# Patient Record
Sex: MALE | Race: WHITE | NOT HISPANIC OR LATINO
[De-identification: names, ages, dates, MRNs, and addresses within clinical notes are randomized per-mention and may not be internally consistent; named-entity substitution may affect disease eponyms.]

---

## 2021-03-22 PROBLEM — Z00.00 ENCOUNTER FOR PREVENTIVE HEALTH EXAMINATION: Status: ACTIVE | Noted: 2021-03-22

## 2021-05-05 ENCOUNTER — APPOINTMENT (OUTPATIENT)
Dept: OTOLARYNGOLOGY | Facility: CLINIC | Age: 41
End: 2021-05-05
Payer: COMMERCIAL

## 2021-05-05 VITALS
DIASTOLIC BLOOD PRESSURE: 82 MMHG | TEMPERATURE: 97.2 F | HEART RATE: 60 BPM | WEIGHT: 175 LBS | BODY MASS INDEX: 24.5 KG/M2 | SYSTOLIC BLOOD PRESSURE: 122 MMHG | HEIGHT: 71 IN

## 2021-05-05 DIAGNOSIS — Z80.9 FAMILY HISTORY OF MALIGNANT NEOPLASM, UNSPECIFIED: ICD-10-CM

## 2021-05-05 DIAGNOSIS — Z84.89 FAMILY HISTORY OF OTHER SPECIFIED CONDITIONS: ICD-10-CM

## 2021-05-05 DIAGNOSIS — Z78.9 OTHER SPECIFIED HEALTH STATUS: ICD-10-CM

## 2021-05-05 PROCEDURE — 92504 EAR MICROSCOPY EXAMINATION: CPT

## 2021-05-05 PROCEDURE — 99244 OFF/OP CNSLTJ NEW/EST MOD 40: CPT | Mod: 25

## 2021-05-05 PROCEDURE — 92567 TYMPANOMETRY: CPT

## 2021-05-05 PROCEDURE — 99072 ADDL SUPL MATRL&STAF TM PHE: CPT

## 2021-05-05 PROCEDURE — 92557 COMPREHENSIVE HEARING TEST: CPT
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
## 2021-05-05 NOTE — DATA REVIEWED
[de-identified] : I personally reviewed the patient's audiogram, which shows\par AD: mild to moderately severe CHL, CNS right tymp\par AS: hearing wnl , type A tymp\par excellent WRS

## 2021-05-05 NOTE — REASON FOR VISIT
[Initial Consultation] : an initial consultation for [FreeTextEntry2] : hearing loss and pressure in R ear
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
## 2021-05-05 NOTE — REVIEW OF SYSTEMS
[Sneezing] : sneezing [Seasonal Allergies] : seasonal allergies [Ear Pain] : ear pain [Ear Itch] : ear itch [Hearing Loss] : hearing loss [Ear Noises] : ear noises [Sinus Pain] : sinus pain [Sinus Pressure] : sinus pressure [Eye Pain] : eye pain [Negative] : Heme/Lymph [de-identified] : headache  [FreeTextEntry1] : fatigue

## 2021-05-05 NOTE — PHYSICAL EXAM
[Normal] : no rashes [de-identified] : AD: normal appearing TM, middle ear, no effusion.  Barbosa's to Right, AC>BC.  AS:normal [de-identified] : House-Brackmann 1/6 right and left

## 2021-09-07 ENCOUNTER — OUTPATIENT (OUTPATIENT)
Dept: OUTPATIENT SERVICES | Facility: HOSPITAL | Age: 41
LOS: 1 days | End: 2021-09-07

## 2021-09-07 VITALS
RESPIRATION RATE: 16 BRPM | TEMPERATURE: 97 F | HEART RATE: 68 BPM | HEIGHT: 71 IN | SYSTOLIC BLOOD PRESSURE: 128 MMHG | DIASTOLIC BLOOD PRESSURE: 84 MMHG | OXYGEN SATURATION: 98 % | WEIGHT: 173.94 LBS

## 2021-09-07 DIAGNOSIS — Z98.890 OTHER SPECIFIED POSTPROCEDURAL STATES: Chronic | ICD-10-CM

## 2021-09-07 DIAGNOSIS — Z01.812 ENCOUNTER FOR PREPROCEDURAL LABORATORY EXAMINATION: ICD-10-CM

## 2021-09-07 DIAGNOSIS — H90.11 CONDUCTIVE HEARING LOSS, UNILATERAL, RIGHT EAR, WITH UNRESTRICTED HEARING ON THE CONTRALATERAL SIDE: ICD-10-CM

## 2021-09-07 DIAGNOSIS — H93.291 OTHER ABNORMAL AUDITORY PERCEPTIONS, RIGHT EAR: ICD-10-CM

## 2021-09-07 LAB
ALBUMIN SERPL ELPH-MCNC: 4.5 G/DL — SIGNIFICANT CHANGE UP (ref 3.3–5)
ALP SERPL-CCNC: 57 U/L — SIGNIFICANT CHANGE UP (ref 40–120)
ALT FLD-CCNC: 29 U/L — SIGNIFICANT CHANGE UP (ref 4–41)
ANION GAP SERPL CALC-SCNC: 11 MMOL/L — SIGNIFICANT CHANGE UP (ref 7–14)
AST SERPL-CCNC: 22 U/L — SIGNIFICANT CHANGE UP (ref 4–40)
BILIRUB SERPL-MCNC: 0.3 MG/DL — SIGNIFICANT CHANGE UP (ref 0.2–1.2)
BUN SERPL-MCNC: 10 MG/DL — SIGNIFICANT CHANGE UP (ref 7–23)
CALCIUM SERPL-MCNC: 9.4 MG/DL — SIGNIFICANT CHANGE UP (ref 8.4–10.5)
CHLORIDE SERPL-SCNC: 101 MMOL/L — SIGNIFICANT CHANGE UP (ref 98–107)
CO2 SERPL-SCNC: 27 MMOL/L — SIGNIFICANT CHANGE UP (ref 22–31)
CREAT SERPL-MCNC: 0.97 MG/DL — SIGNIFICANT CHANGE UP (ref 0.5–1.3)
GLUCOSE SERPL-MCNC: 91 MG/DL — SIGNIFICANT CHANGE UP (ref 70–99)
HCT VFR BLD CALC: 44.9 % — SIGNIFICANT CHANGE UP (ref 39–50)
HGB BLD-MCNC: 15.2 G/DL — SIGNIFICANT CHANGE UP (ref 13–17)
MCHC RBC-ENTMCNC: 32.1 PG — SIGNIFICANT CHANGE UP (ref 27–34)
MCHC RBC-ENTMCNC: 33.9 GM/DL — SIGNIFICANT CHANGE UP (ref 32–36)
MCV RBC AUTO: 94.9 FL — SIGNIFICANT CHANGE UP (ref 80–100)
NRBC # BLD: 0 /100 WBCS — SIGNIFICANT CHANGE UP
NRBC # FLD: 0 K/UL — SIGNIFICANT CHANGE UP
PLATELET # BLD AUTO: 242 K/UL — SIGNIFICANT CHANGE UP (ref 150–400)
POTASSIUM SERPL-MCNC: 4.2 MMOL/L — SIGNIFICANT CHANGE UP (ref 3.5–5.3)
POTASSIUM SERPL-SCNC: 4.2 MMOL/L — SIGNIFICANT CHANGE UP (ref 3.5–5.3)
PROT SERPL-MCNC: 6.7 G/DL — SIGNIFICANT CHANGE UP (ref 6–8.3)
RBC # BLD: 4.73 M/UL — SIGNIFICANT CHANGE UP (ref 4.2–5.8)
RBC # FLD: 11.6 % — SIGNIFICANT CHANGE UP (ref 10.3–14.5)
SODIUM SERPL-SCNC: 139 MMOL/L — SIGNIFICANT CHANGE UP (ref 135–145)
WBC # BLD: 5.42 K/UL — SIGNIFICANT CHANGE UP (ref 3.8–10.5)
WBC # FLD AUTO: 5.42 K/UL — SIGNIFICANT CHANGE UP (ref 3.8–10.5)

## 2021-09-07 NOTE — H&P PST ADULT - NSICDXFAMILYHX_GEN_ALL_CORE_FT
FAMILY HISTORY:  Father  Still living? Unknown  FH: heart disease, Age at diagnosis: Age Unknown    Mother  Still living? Unknown  FH: breast cancer, Age at diagnosis: Age Unknown    Sibling  Still living? Unknown  FH: breast cancer, Age at diagnosis: Age Unknown
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
## 2021-09-07 NOTE — H&P PST ADULT - NEGATIVE NEUROLOGICAL SYMPTOMS
no transient paralysis/no weakness/no paresthesias/no generalized seizures/no focal seizures/no syncope/no tremors/no vertigo/no difficulty walking/no headache

## 2021-09-07 NOTE — H&P PST ADULT - NEGATIVE ENMT SYMPTOMS
no hearing difficulty/no vertigo/no nasal congestion/no nose bleeds/no gum bleeding/no throat pain/no dysphagia no hearing difficulty/no ear pain/no vertigo/no nasal congestion/no nose bleeds/no gum bleeding/no throat pain/no dysphagia

## 2021-09-07 NOTE — H&P PST ADULT - PROBLEM SELECTOR PLAN 1
Patient scheduled for right tympanoplasty with ossicular reconstruction, fat/fascia raft cartilage graft on 9/21/2021  Written & verbal preop instructions, gi prophylaxis given  Pt verbalized good understanding. Patient scheduled for right tympanoplasty with ossicular reconstruction, fat/fascia raft cartilage graft on 9/21/2021  Written & verbal preop instructions, gi prophylaxis given  Pt verbalized good understanding.  Patient with Penicillin allergy

## 2021-09-12 PROBLEM — H93.291 OTHER ABNORMAL AUDITORY PERCEPTIONS, RIGHT EAR: Chronic | Status: ACTIVE | Noted: 2021-09-07

## 2021-09-18 ENCOUNTER — APPOINTMENT (OUTPATIENT)
Dept: DISASTER EMERGENCY | Facility: CLINIC | Age: 41
End: 2021-09-18

## 2021-09-18 DIAGNOSIS — Z01.818 ENCOUNTER FOR OTHER PREPROCEDURAL EXAMINATION: ICD-10-CM

## 2021-09-19 LAB — SARS-COV-2 N GENE NPH QL NAA+PROBE: NOT DETECTED

## 2021-09-20 ENCOUNTER — TRANSCRIPTION ENCOUNTER (OUTPATIENT)
Age: 41
End: 2021-09-20

## 2021-09-20 VITALS
SYSTOLIC BLOOD PRESSURE: 135 MMHG | TEMPERATURE: 98 F | RESPIRATION RATE: 16 BRPM | HEART RATE: 58 BPM | WEIGHT: 173.94 LBS | HEIGHT: 71 IN | DIASTOLIC BLOOD PRESSURE: 95 MMHG | OXYGEN SATURATION: 100 %

## 2021-09-21 ENCOUNTER — OUTPATIENT (OUTPATIENT)
Dept: OUTPATIENT SERVICES | Facility: HOSPITAL | Age: 41
LOS: 1 days | Discharge: HOME | End: 2021-09-21
Payer: COMMERCIAL

## 2021-09-21 ENCOUNTER — APPOINTMENT (OUTPATIENT)
Dept: OTOLARYNGOLOGY | Facility: AMBULATORY SURGERY CENTER | Age: 41
End: 2021-09-21

## 2021-09-21 ENCOUNTER — RESULT REVIEW (OUTPATIENT)
Age: 41
End: 2021-09-21

## 2021-09-21 VITALS
SYSTOLIC BLOOD PRESSURE: 123 MMHG | RESPIRATION RATE: 13 BRPM | HEART RATE: 68 BPM | TEMPERATURE: 98 F | DIASTOLIC BLOOD PRESSURE: 86 MMHG | OXYGEN SATURATION: 96 %

## 2021-09-21 DIAGNOSIS — Z98.890 OTHER SPECIFIED POSTPROCEDURAL STATES: Chronic | ICD-10-CM

## 2021-09-21 DIAGNOSIS — H90.11 CONDUCTIVE HEARING LOSS, UNILATERAL, RIGHT EAR, WITH UNRESTRICTED HEARING ON THE CONTRALATERAL SIDE: ICD-10-CM

## 2021-09-21 PROCEDURE — 88311 DECALCIFY TISSUE: CPT | Mod: 26

## 2021-09-21 PROCEDURE — 88304 TISSUE EXAM BY PATHOLOGIST: CPT | Mod: 26

## 2021-09-21 PROCEDURE — 92516 FACIAL NERVE FUNCTION TEST: CPT

## 2021-09-21 PROCEDURE — 21235 EAR CARTILAGE GRAFT: CPT

## 2021-09-21 PROCEDURE — 69633 REBUILD EARDRUM STRUCTURES: CPT

## 2021-09-21 RX ORDER — AZITHROMYCIN 500 MG/1
1 TABLET, FILM COATED ORAL
Qty: 1 | Refills: 0
Start: 2021-09-21

## 2021-09-21 RX ORDER — OXYCODONE HYDROCHLORIDE 5 MG/1
1 TABLET ORAL
Qty: 10 | Refills: 0
Start: 2021-09-21

## 2021-09-21 RX ORDER — OFLOXACIN OTIC SOLUTION 3 MG/ML
5 SOLUTION/ DROPS AURICULAR (OTIC)
Qty: 5 | Refills: 0
Start: 2021-09-21

## 2021-09-21 RX ORDER — LORATADINE 10 MG/1
1 TABLET ORAL
Qty: 0 | Refills: 0 | DISCHARGE

## 2021-09-21 NOTE — ASU DISCHARGE PLAN (ADULT/PEDIATRIC) - CARE PROVIDER_API CALL
Aramis Sheehan)  Neurotology; Otolaryngology  [address] [place] [address]  Phone: [phone]  Fax: [phone]  Follow Up Time:

## 2021-09-21 NOTE — BRIEF OPERATIVE NOTE - OPERATION/FINDINGS
right tympanoplasty and OCR  Incus was  from stapes - incus was removed - PORP was placed  PORP covered with tragal cartilage graft  perichondrium placed between cartilage and TM with 100% coverage

## 2021-09-27 LAB — SURGICAL PATHOLOGY STUDY: SIGNIFICANT CHANGE UP

## 2021-10-06 ENCOUNTER — APPOINTMENT (OUTPATIENT)
Dept: OTOLARYNGOLOGY | Facility: CLINIC | Age: 41
End: 2021-10-06
Payer: COMMERCIAL

## 2021-10-06 VITALS
SYSTOLIC BLOOD PRESSURE: 136 MMHG | WEIGHT: 175 LBS | BODY MASS INDEX: 24.5 KG/M2 | DIASTOLIC BLOOD PRESSURE: 94 MMHG | HEIGHT: 71 IN | HEART RATE: 81 BPM

## 2021-10-06 PROCEDURE — 99024 POSTOP FOLLOW-UP VISIT: CPT

## 2021-10-06 NOTE — CONSULT LETTER
[FreeTextEntry2] : Ilya Irene MD [FreeTextEntry1] : Dear Shane,\par \par Javier Solis presents for his first postoperative visit 2 weeks status post right middle ear exploration and ossiculoplasty.  At the time of surgery, as expected we found an incudostapedial joint dislocation from his prior temporal bone fracture.  We removed the incus and placed a PORP.  Since surgery, he already notes significant hearing improvement and reduction in his tinnitus.  He has not had any pain, drainage or dizziness.  Otoscopic exam shows a well-healed right tympanic membrane, normal facial function, and healed tragal incision.  Barbosa is midline and Rinne test shows air conduction greater than bone conduction in the right ear.  \par \par Overall he is doing well.  I asked that he continue to maintain dry ear precautions, and we will see him back in 3 months with a postoperative audiogram.\par \par Thank you once again for the opportunity to participate in your patient's care, and I will keep you informed as to his progress.\par \par Best regards,\par \par Umer Sheehan MD\par Otology/Neurotology\par [facility]\par [facility]

## 2022-01-10 ENCOUNTER — APPOINTMENT (OUTPATIENT)
Dept: OTOLARYNGOLOGY | Facility: CLINIC | Age: 42
End: 2022-01-10
Payer: SELF-PAY

## 2022-01-10 VITALS
WEIGHT: 177 LBS | HEIGHT: 71 IN | DIASTOLIC BLOOD PRESSURE: 89 MMHG | BODY MASS INDEX: 24.78 KG/M2 | HEART RATE: 67 BPM | SYSTOLIC BLOOD PRESSURE: 138 MMHG

## 2022-01-10 DIAGNOSIS — H74.20 DISCONTINUITY AND DISLOCATION OF EAR OSSICLES, UNSPECIFIED EAR: ICD-10-CM

## 2022-01-10 DIAGNOSIS — S02.19XA OTHER FRACTURE OF BASE OF SKULL, INITIAL ENCOUNTER FOR CLOSED FRACTURE: ICD-10-CM

## 2022-01-10 DIAGNOSIS — H90.11 CONDUCTIVE HEARING LOSS, UNILATERAL, RIGHT EAR, WITH UNRESTRICTED HEARING ON THE CONTRALATERAL SIDE: ICD-10-CM

## 2022-01-10 DIAGNOSIS — H93.291 OTHER ABNORMAL AUDITORY PERCEPTIONS, RIGHT EAR: ICD-10-CM

## 2022-01-10 PROCEDURE — 92567 TYMPANOMETRY: CPT

## 2022-01-10 PROCEDURE — 92557 COMPREHENSIVE HEARING TEST: CPT

## 2022-01-10 PROCEDURE — 92504 EAR MICROSCOPY EXAMINATION: CPT

## 2022-01-10 PROCEDURE — 99213 OFFICE O/P EST LOW 20 MIN: CPT | Mod: 25

## 2022-01-10 NOTE — CONSULT LETTER
[FreeTextEntry2] : Ilya Irene MD [FreeTextEntry1] : Dear Shane,\par \par Javier Solis presents for his 3-month postoperative visit status post right ossiculoplasty for temporal bone fracture and ossicular chain disruption.  His otoscopic exam today shows an intact right tympanic membrane reconstruction with cartilage graft overlying the previously placed prosthesis, and a postoperative audiogram shows good closure of his air-bone gap in the right ear to within 10 dB across most frequencies.  He is very pleased with his hearing improvement, and I recommended follow-up once more in 6 months to ensure continued stable hearing improvement.  He is also having more longstanding issues with right nasal congestion and on exam has a substantial septal deviation; for this he plans to follow-up with you for discussion regarding risk versus benefits of septoplasty.\par \par Thank you once again for the opportunity to participate in your patient's care, and I will keep you informed as to his progress.\par \par Best regards,\par \par Umer Sheehan MD\par Otology/Neurotology\par [facility]\par [facility]

## 2022-06-06 ENCOUNTER — APPOINTMENT (OUTPATIENT)
Dept: OTOLARYNGOLOGY | Facility: CLINIC | Age: 42
End: 2022-06-06

## 2022-07-13 NOTE — H&P PST ADULT - GENERAL
Problem: Adult Inpatient Plan of Care  Goal: Plan of Care Review  Outcome: Ongoing, Progressing  Goal: Patient-Specific Goal (Individualized)  Outcome: Ongoing, Progressing  Goal: Absence of Hospital-Acquired Illness or Injury  Outcome: Ongoing, Progressing  Goal: Optimal Comfort and Wellbeing  Outcome: Ongoing, Progressing  Goal: Readiness for Transition of Care  Outcome: Ongoing, Progressing     Alert and oriented x4. Vitals stable. NPO since midnight. CT done. Free from falls and injuries during shift. No concerns or requests voiced. Bed low with side rails up x2. Call bell within reach. Will continue plan of care.      details…

## 2024-09-12 NOTE — H&P PST ADULT - NEGATIVE SKIN SYMPTOMS
September 12, 2024     Patient: El Johns  YOB: 2016  Date of Visit: 9/12/2024      To Whom it May Concern:    El Johns is under my professional care. El was seen in my office on 9/12/2024. El may return to school on today and should not return to gym class or sports until cleared by a physician.        Please provide for extra time between classes if necessary.    Please provide for any assistance with carrying books or writing if necessary.    Please provide for an elevator pass if necessary.    Allow him to walk with Boot     If you have any questions or concerns, please don't hesitate to call.         Sincerely,          Storm Haddad,         CC: No Recipients   no rash/no itching/no dryness